# Patient Record
Sex: MALE | Race: WHITE | NOT HISPANIC OR LATINO | URBAN - METROPOLITAN AREA
[De-identification: names, ages, dates, MRNs, and addresses within clinical notes are randomized per-mention and may not be internally consistent; named-entity substitution may affect disease eponyms.]

---

## 2020-09-20 DIAGNOSIS — E55.9 VITAMIN D DEFICIENCY: Primary | ICD-10-CM

## 2020-09-21 RX ORDER — CHOLECALCIFEROL (VITAMIN D3) 125 MCG
CAPSULE ORAL
Qty: 30 CAPSULE | Refills: 17 | Status: SHIPPED | OUTPATIENT
Start: 2020-09-21

## 2020-10-21 ENCOUNTER — TRANSCRIBE ORDERS (OUTPATIENT)
Dept: LAB | Facility: CLINIC | Age: 50
End: 2020-10-21

## 2020-10-21 DIAGNOSIS — Z13.1 SCREENING FOR DIABETES MELLITUS: ICD-10-CM

## 2020-10-21 DIAGNOSIS — Z13.228 ENCOUNTER FOR SCREENING FOR METABOLIC DISORDER: Primary | ICD-10-CM

## 2020-10-21 DIAGNOSIS — Z00.01 ENCOUNTER FOR GENERAL ADULT MEDICAL EXAMINATION WITH ABNORMAL FINDINGS: ICD-10-CM

## 2023-02-09 ENCOUNTER — TELEPHONE (OUTPATIENT)
Dept: PSYCHIATRY | Facility: CLINIC | Age: 53
End: 2023-02-09

## 2023-02-09 ENCOUNTER — HOSPITAL ENCOUNTER (EMERGENCY)
Facility: HOSPITAL | Age: 53
Discharge: HOME | End: 2023-02-09
Attending: EMERGENCY MEDICINE

## 2023-02-09 VITALS
HEART RATE: 68 BPM | DIASTOLIC BLOOD PRESSURE: 73 MMHG | OXYGEN SATURATION: 97 % | RESPIRATION RATE: 18 BRPM | SYSTOLIC BLOOD PRESSURE: 151 MMHG | TEMPERATURE: 99 F

## 2023-02-09 DIAGNOSIS — R45.851 SUICIDAL IDEATION: Primary | ICD-10-CM

## 2023-02-09 LAB
ETHANOL EXG-MCNC: 0 MG/DL
FLUAV RNA RESP QL NAA+PROBE: NEGATIVE
FLUBV RNA RESP QL NAA+PROBE: NEGATIVE
RSV RNA RESP QL NAA+PROBE: NEGATIVE
SARS-COV-2 RNA RESP QL NAA+PROBE: NEGATIVE

## 2023-02-09 NOTE — TELEPHONE ENCOUNTER
Pt called the office and left a voicemail  Writer returned the call and had to [facility]

## 2023-02-09 NOTE — Clinical Note
Jose Gjessicaigor Dugan should be transferred out pending crisis evaluation and has been medically cleared

## 2023-02-10 ENCOUNTER — TELEPHONE (OUTPATIENT)
Dept: PSYCHIATRY | Facility: CLINIC | Age: 53
End: 2023-02-10

## 2023-02-10 NOTE — DISCHARGE INSTRUCTIONS
Patient is being discharged at this time with referrals and/or information about hotline / warm line numbers; walk-in clinic information; local outpatient resource list for therapists / counselors, psychologists, psychiatrists, and partial programs  [place] Crisis Number: Bianca Blake [phone]  [place] Suicide/Crisis Lifeline: PYKB 706, Press 1 for [facility] [employer]  Crisis Text Line: Text Connect to 402446  [facility] david that connects you to a mental health professional      Advised to utilize natural and existing supports  Advised to continue medication regimen  Advised for safety planning and effective coping skills  Advised to return to the ED if you begin having thoughts or urges to harm yourself  SAFETY PLAN  Warning Signs (thoughts, images, mood, behavior, situations) of a potential crisis: Thoughts or urges to harm yourself     Coping Skills (what can I do to take my mind off the problem, or to keep myself safe:  Watching movies, painting, writing, working out, eating healthy, adequate rest, medication and deep breathing techniques       Outside Support (who can I reach out to for support and help: Family, Bret Parnell (therapist)

## 2023-02-10 NOTE — ED PROVIDER NOTES
History  Chief Complaint   Patient presents with   • Medication Problem     Pt reports he takes 20 mg citalopram daily, reports that he feels he is experiencing "deeper lows" and feels he needs a different medication; This is a 60-year-old male who presents to the emergency department complaining of feeling depressed  The patient states he has been taking Lexapro for over 2 months and feels like it is not working at this point  He states that over the last 2 days he has thoughts of not being here anymore  He denies any current suicidal ideation  He denies any plans of hurting himself  He states that he was not comfortable with these thoughts and decided to come to the hospital           Prior to Admission Medications   Prescriptions Last Dose Informant Patient Reported? Taking? [facility] D3 125 MCG (5000 UT) capsule   No No   Sig: TAKE 1 CAPSULE BY MOUTH EVERY DAY      Facility-Administered Medications: None       Past Medical History:   Diagnosis Date   • Asthma        Past Surgical History:   Procedure Laterality Date   • CIRCUMCISION         Family History   Problem Relation Age of Onset   • Depression Mother    • Prostate cancer Father    • Depression Sister    • Hypothyroidism Sister    • Heart disease Maternal Grandfather    • Lymphoma Paternal Grandfather      I have reviewed and agree with the history as documented  E-Cigarette/Vaping     E-Cigarette/Vaping Substances     Social History     Tobacco Use   • Smoking status: Never     Passive exposure: Never   • Smokeless tobacco: Never   Substance Use Topics   • Drug use: Not Currently       Review of Systems   All other systems reviewed and are negative        Physical Exam  Physical Exam  Constitutional:  Vital signs reviewed, patient appears nontoxic, no acute distress  Eyes: Pupils equal round reactive to light and accommodation, extraocular muscles intact  HEENT: trachea midline, no JVD, moist mucous membranes  Respiratory: lung sounds clear throughout, no rhonchi, no rales  Cardiovascular: regular rate rhythm, no murmurs or rubs  Abdomen: soft, nontender, nondistended, no rebound or guarding  Back: no CVA tenderness, normal inspection  Extremities: no edema, pulses equal in all 4 extremities  Neuro: awake, alert, oriented, no focal weakness  Skin: warm, dry, intact, no rashes noted  Vital Signs  ED Triage Vitals [02/09/23 1909]   Temperature Pulse Respirations Blood Pressure SpO2   99 °F (37 2 °C) 68 18 151/73 97 %      Temp Source Heart Rate Source Patient Position - Orthostatic VS BP Location FiO2 (%)   Oral Monitor Sitting Left arm --      Pain Score       --           Vitals:    02/09/23 1909   BP: 151/73   Pulse: 68   Patient Position - Orthostatic VS: Sitting         Visual Acuity      ED Medications  Medications - No data to display    Diagnostic Studies  Results Reviewed     Procedure Component Value Units Date/Time    FLU/RSV/COVID - if FLU/RSV clinically relevant [[id number]]  (Normal) Collected: 02/09/23 1949    Lab Status: Final result Specimen: Nares from Nose Updated: 02/09/23 2039     SARS-CoV-2 Negative     INFLUENZA A PCR Negative     INFLUENZA B PCR Negative     RSV PCR Negative    Narrative:      FOR PEDIATRIC PATIENTS - copy/paste COVID Guidelines URL to browser: [email or web address]  [employer]    SARS-CoV-2 assay is a Nucleic Acid Amplification assay intended for the  qualitative detection of nucleic acid from SARS-CoV-2 in nasopharyngeal  swabs  Results are for the presumptive identification of SARS-CoV-2 RNA  Positive results are indicative of infection with SARS-CoV-2, the virus  causing COVID-19, but do not rule out bacterial infection or co-infection  with other viruses  Laboratories within the [place] and its  territories are required to report all positive results to the appropriate  public health authorities   Negative results do not preclude SARS-CoV-2  infection and should not be used as the sole basis for treatment or other  patient management decisions  Negative results must be combined with  clinical observations, patient history, and epidemiological information  This test has not been FDA cleared or approved  This test has been authorized by FDA under an Emergency Use Authorization  (EUA)  This test is only authorized for the duration of time the  declaration that circumstances exist justifying the authorization of the  emergency use of an in vitro diagnostic tests for detection of SARS-CoV-2  virus and/or diagnosis of COVID-19 infection under section 564(b)(1) of  the Act, 21 U  S C  900KMO-8(K)(6), unless the authorization is terminated  or revoked sooner  The test has been validated but independent review by FDA  and CLIA is pending  Test performed using [employer] GeneXpert: This RT-PCR assay targets N2,  a region unique to SARS-CoV-2  A conserved region in the E-gene was chosen  for pan-Sarbecovirus detection which includes SARS-CoV-2  According to CMS-2020-01-R, this platform meets the definition of high-throughput technology  POCT alcohol breath test [[id number]]  (Normal) Resulted: 02/09/23 1946    Lab Status: Final result Updated: 02/09/23 1946     EXTBreath Alcohol 0 00    Rapid drug screen, urine [[id number]]     Lab Status: No result Specimen: Urine                  No orders to display              Procedures  Procedures         ED Course                               SBIRT 22yo+    Flowsheet Row Most Recent Value   SBIRT (23 yo +)    In order to provide better care to our patients, we are screening all of our patients for alcohol and drug use  Would it be okay to ask you these screening questions? No Filed at: 02/09/2023 1952                    Medical Decision Making  This is a 81-year-old  male who presented to the emergency department with thoughts of suicide  At the current time the patient has no suicidal ideation or suicide plan    He would like to discuss options of inpatient versus partial program versus following up with a psychiatrist    I discussed the patient with crisis  The patient did discuss his options with crisis and he again stated that he has no suicidal ideation at this time and feels safe to be discharged  He prefers getting resources that he can follow-up with tomorrow  At this point I feel the patient is at minimal risk of hurting himself  He will be discharged with follow-up to his primary care physician and multiple resources on suicidal ideation and resources to follow-up with tomorrow  He was advised to return to the emergency department if he has any further thoughts or concerns for his safety  Suicidal ideation: complicated acute illness or injury  Amount and/or Complexity of Data Reviewed  Labs: ordered  Disposition  Final diagnoses:   Suicidal ideation     Time reflects when diagnosis was documented in both MDM as applicable and the Disposition within this note     Time User Action Codes Description Comment    2/9/2023  8:15 PM Paxton Yadav Add [L91 019] Suicidal ideation       ED Disposition     ED Disposition   Discharge    Condition   Stable    Date/Time   Thu Feb 9, 2023  9:09 PM    Comment   Jet Daugherty should be transferred out pending crisis evaluation and has been medically cleared             MD Documentation    Laurent Gaston Most Recent Value   Sending MD Dr Yosi Napier up With Specialties Details Why Contact Info Additional Information    Richard Clifford, [address] [facility], Nurse Practitioner In 2 days  [address] [facility] [phone]       R Valerie Fleming [address] Emergency Department Emergency Medicine  If symptoms worsen [address] [facility],[phone]  [facility] Emergency Department, [address]          Discharge Medication List as of 2/9/2023  9:10 PM      CONTINUE these medications which have NOT CHANGED    Details   CVS D3 125 MCG (5000 UT) capsule TAKE 1 CAPSULE BY MOUTH EVERY DAY, Normal             No discharge procedures on file      PDMP Review     None          ED Provider  Electronically Signed by           Mi Cannon DO  02/09/23 5667

## 2023-02-10 NOTE — ED NOTES
This writer discussed the patient's current presentation and recommended discharge plan with Dr Evelyn Fermin  They agree with the patient being discharged at this time with referrals and/or information about hotline / warm line numbers; walk-in clinic information; local outpatient resource list for therapists / counselors, psychologists, psychiatrists, and partial programs  [place] Crisis Number: Jenniffer Lazo [phone]  National Suicide/Crisis Lifeline: AQWR 588, Press 1 for [facility] Financial  Crisis Text Line: Text Connect to 066146  [place] david that connects you to a mental health professional     Advised to utilize natural and existing supports  Advised to continue medication regimen  Advised for safety planning and effective coping skills  Advised to return to the ED if you begin having thoughts or urges to harm youself  SAFETY PLAN  Warning Signs (thoughts, images, mood, behavior, situations) of a potential crisis: Thoughts or urges to harm yourself     Coping Skills (what can I do to take my mind off the problem, or to keep myself safe:  Watching movies, painting, writing, working out, eating healthy, adequate rest, medication and deep breathing techniques       Outside Support (who can I reach out to for support and help: Family, Lesvia Mobley (therapist)

## 2023-02-10 NOTE — ED NOTES
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 - his primary stressor  Patient feels like he is at a dead end with his life though is future focused  Patient has been prescribed Lexapro over the past 2 months, is compliant though feels he needs to see a psychiatrist for medication adjustment  Patient states that over over the past 3 days has had lower energy, fatigue, poor motivation, and insomnia  Last evening, the patient began having "dark thoughts" of no longer wanting to live, no plan  Patient denies any current plan to harm himself; self harming; suicide attempts; homicidal ideations; audiovisual hallucinations; paranoia; hopelessness  Patient states he has gained some body weight which he said is a good thing for his build  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  Denies any illicit drug use; access to firearms; legal issues; history of trauma or abuse  Patient lives alone though has family who support him (parents and 2 sisters)  States he sees a therapist biweekly Mayra Dejesus) and has been taking Lexapro over the past 2 months, which is prescribed by an online provider  Patient is interested in securing a local outpatient psychiatry provider  Patient has never been hospitalized  Patient was groomed, AAX4, coherent, broad affect, good eye contact, mildly anxious, polite, calm and cooperative  Discussed treatment options with the patient  Patient was offered inpatient and politely declined  No criteria for 302  Patient would like to pursue though walk-in Center at [facility] before seeking partial hospitalization  Completed safety plan with patient  See separate note for the plan